# Patient Record
Sex: FEMALE | Race: OTHER | NOT HISPANIC OR LATINO
[De-identification: names, ages, dates, MRNs, and addresses within clinical notes are randomized per-mention and may not be internally consistent; named-entity substitution may affect disease eponyms.]

---

## 2018-09-19 ENCOUNTER — APPOINTMENT (OUTPATIENT)
Dept: DERMATOLOGY | Facility: CLINIC | Age: 63
End: 2018-09-19

## 2020-11-04 ENCOUNTER — RESULT REVIEW (OUTPATIENT)
Age: 65
End: 2020-11-04

## 2024-05-09 ENCOUNTER — INPATIENT (INPATIENT)
Facility: HOSPITAL | Age: 69
LOS: 2 days | Discharge: HOME | End: 2024-05-12
Attending: INTERNAL MEDICINE | Admitting: INTERNAL MEDICINE
Payer: COMMERCIAL

## 2024-05-09 VITALS
TEMPERATURE: 98 F | OXYGEN SATURATION: 97 % | HEART RATE: 58 BPM | WEIGHT: 130.07 LBS | RESPIRATION RATE: 18 BRPM | DIASTOLIC BLOOD PRESSURE: 75 MMHG | SYSTOLIC BLOOD PRESSURE: 152 MMHG | HEIGHT: 66 IN

## 2024-05-09 DIAGNOSIS — R74.01 ELEVATION OF LEVELS OF LIVER TRANSAMINASE LEVELS: ICD-10-CM

## 2024-05-09 DIAGNOSIS — R00.1 BRADYCARDIA, UNSPECIFIED: ICD-10-CM

## 2024-05-09 DIAGNOSIS — R57.9 SHOCK, UNSPECIFIED: ICD-10-CM

## 2024-05-09 DIAGNOSIS — I10 ESSENTIAL (PRIMARY) HYPERTENSION: ICD-10-CM

## 2024-05-09 DIAGNOSIS — R55 SYNCOPE AND COLLAPSE: ICD-10-CM

## 2024-05-09 DIAGNOSIS — D50.9 IRON DEFICIENCY ANEMIA, UNSPECIFIED: ICD-10-CM

## 2024-05-09 DIAGNOSIS — T44.7X5A ADVERSE EFFECT OF BETA-ADRENORECEPTOR ANTAGONISTS, INITIAL ENCOUNTER: ICD-10-CM

## 2024-05-09 DIAGNOSIS — E83.39 OTHER DISORDERS OF PHOSPHORUS METABOLISM: ICD-10-CM

## 2024-05-09 DIAGNOSIS — E87.20 ACIDOSIS, UNSPECIFIED: ICD-10-CM

## 2024-05-09 DIAGNOSIS — K21.9 GASTRO-ESOPHAGEAL REFLUX DISEASE WITHOUT ESOPHAGITIS: ICD-10-CM

## 2024-05-09 DIAGNOSIS — E78.5 HYPERLIPIDEMIA, UNSPECIFIED: ICD-10-CM

## 2024-05-09 DIAGNOSIS — N17.9 ACUTE KIDNEY FAILURE, UNSPECIFIED: ICD-10-CM

## 2024-05-09 DIAGNOSIS — E11.9 TYPE 2 DIABETES MELLITUS WITHOUT COMPLICATIONS: ICD-10-CM

## 2024-05-09 LAB
ALBUMIN SERPL ELPH-MCNC: 3.5 G/DL — SIGNIFICANT CHANGE UP (ref 3.3–5)
ALP SERPL-CCNC: 86 U/L — SIGNIFICANT CHANGE UP (ref 40–120)
ALT FLD-CCNC: 105 U/L — HIGH (ref 12–78)
ANION GAP SERPL CALC-SCNC: 7 MMOL/L — SIGNIFICANT CHANGE UP (ref 5–17)
ANISOCYTOSIS BLD QL: SLIGHT — SIGNIFICANT CHANGE UP
APTT BLD: 30.7 SEC — SIGNIFICANT CHANGE UP (ref 24.5–35.6)
AST SERPL-CCNC: 73 U/L — HIGH (ref 15–37)
BASOPHILS # BLD AUTO: 0.04 K/UL — SIGNIFICANT CHANGE UP (ref 0–0.2)
BASOPHILS NFR BLD AUTO: 0.4 % — SIGNIFICANT CHANGE UP (ref 0–2)
BILIRUB SERPL-MCNC: 0.4 MG/DL — SIGNIFICANT CHANGE UP (ref 0.2–1.2)
BUN SERPL-MCNC: 13 MG/DL — SIGNIFICANT CHANGE UP (ref 7–23)
CALCIUM SERPL-MCNC: 9.3 MG/DL — SIGNIFICANT CHANGE UP (ref 8.5–10.1)
CHLORIDE SERPL-SCNC: 104 MMOL/L — SIGNIFICANT CHANGE UP (ref 96–108)
CO2 SERPL-SCNC: 26 MMOL/L — SIGNIFICANT CHANGE UP (ref 22–31)
CREAT SERPL-MCNC: 0.77 MG/DL — SIGNIFICANT CHANGE UP (ref 0.5–1.3)
EGFR: 84 ML/MIN/1.73M2 — SIGNIFICANT CHANGE UP
EOSINOPHIL # BLD AUTO: 0.05 K/UL — SIGNIFICANT CHANGE UP (ref 0–0.5)
EOSINOPHIL NFR BLD AUTO: 0.5 % — SIGNIFICANT CHANGE UP (ref 0–6)
GLUCOSE SERPL-MCNC: 148 MG/DL — HIGH (ref 70–99)
HCT VFR BLD CALC: 36.5 % — SIGNIFICANT CHANGE UP (ref 34.5–45)
HGB BLD-MCNC: 11.6 G/DL — SIGNIFICANT CHANGE UP (ref 11.5–15.5)
HYPOCHROMIA BLD QL: SLIGHT — SIGNIFICANT CHANGE UP
IMM GRANULOCYTES NFR BLD AUTO: 0.2 % — SIGNIFICANT CHANGE UP (ref 0–0.9)
INR BLD: 0.87 RATIO — SIGNIFICANT CHANGE UP (ref 0.85–1.18)
LYMPHOCYTES # BLD AUTO: 2.13 K/UL — SIGNIFICANT CHANGE UP (ref 1–3.3)
LYMPHOCYTES # BLD AUTO: 21.8 % — SIGNIFICANT CHANGE UP (ref 13–44)
MANUAL SMEAR VERIFICATION: SIGNIFICANT CHANGE UP
MCHC RBC-ENTMCNC: 22.9 PG — LOW (ref 27–34)
MCHC RBC-ENTMCNC: 31.8 G/DL — LOW (ref 32–36)
MCV RBC AUTO: 72.1 FL — LOW (ref 80–100)
MICROCYTES BLD QL: SIGNIFICANT CHANGE UP
MONOCYTES # BLD AUTO: 0.42 K/UL — SIGNIFICANT CHANGE UP (ref 0–0.9)
MONOCYTES NFR BLD AUTO: 4.3 % — SIGNIFICANT CHANGE UP (ref 2–14)
NEUTROPHILS # BLD AUTO: 7.11 K/UL — SIGNIFICANT CHANGE UP (ref 1.8–7.4)
NEUTROPHILS NFR BLD AUTO: 72.8 % — SIGNIFICANT CHANGE UP (ref 43–77)
NRBC # BLD: 0 /100 WBCS — SIGNIFICANT CHANGE UP (ref 0–0)
PLAT MORPH BLD: NORMAL — SIGNIFICANT CHANGE UP
PLATELET # BLD AUTO: 250 K/UL — SIGNIFICANT CHANGE UP (ref 150–400)
POTASSIUM SERPL-MCNC: 4.7 MMOL/L — SIGNIFICANT CHANGE UP (ref 3.5–5.3)
POTASSIUM SERPL-SCNC: 4.7 MMOL/L — SIGNIFICANT CHANGE UP (ref 3.5–5.3)
PROT SERPL-MCNC: 7.9 GM/DL — SIGNIFICANT CHANGE UP (ref 6–8.3)
PROTHROM AB SERPL-ACNC: 10.5 SEC — SIGNIFICANT CHANGE UP (ref 9.5–13)
RBC # BLD: 5.06 M/UL — SIGNIFICANT CHANGE UP (ref 3.8–5.2)
RBC # FLD: 14.4 % — SIGNIFICANT CHANGE UP (ref 10.3–14.5)
RBC BLD AUTO: ABNORMAL
SODIUM SERPL-SCNC: 137 MMOL/L — SIGNIFICANT CHANGE UP (ref 135–145)
TROPONIN I, HIGH SENSITIVITY RESULT: 17.7 NG/L — SIGNIFICANT CHANGE UP
WBC # BLD: 9.77 K/UL — SIGNIFICANT CHANGE UP (ref 3.8–10.5)
WBC # FLD AUTO: 9.77 K/UL — SIGNIFICANT CHANGE UP (ref 3.8–10.5)

## 2024-05-09 RX ORDER — ONDANSETRON 8 MG/1
4 TABLET, FILM COATED ORAL ONCE
Refills: 0 | Status: COMPLETED | OUTPATIENT
Start: 2024-05-09 | End: 2024-05-09

## 2024-05-09 RX ORDER — SODIUM CHLORIDE 9 MG/ML
1000 INJECTION, SOLUTION INTRAVENOUS
Refills: 0 | Status: DISCONTINUED | OUTPATIENT
Start: 2024-05-09 | End: 2024-05-10

## 2024-05-09 RX ORDER — GLUCAGON INJECTION, SOLUTION 0.5 MG/.1ML
3 INJECTION, SOLUTION SUBCUTANEOUS ONCE
Refills: 0 | Status: DISCONTINUED | OUTPATIENT
Start: 2024-05-09 | End: 2024-05-09

## 2024-05-09 RX ORDER — ATROPINE SULFATE 0.1 MG/ML
1 SYRINGE (ML) INJECTION ONCE
Refills: 0 | Status: COMPLETED | OUTPATIENT
Start: 2024-05-09 | End: 2024-05-09

## 2024-05-09 RX ORDER — DEXTROSE 50 % IN WATER 50 %
25 SYRINGE (ML) INTRAVENOUS ONCE
Refills: 0 | Status: DISCONTINUED | OUTPATIENT
Start: 2024-05-09 | End: 2024-05-10

## 2024-05-09 RX ORDER — GLUCAGON INJECTION, SOLUTION 0.5 MG/.1ML
3 INJECTION, SOLUTION SUBCUTANEOUS ONCE
Refills: 0 | Status: COMPLETED | OUTPATIENT
Start: 2024-05-09 | End: 2024-05-09

## 2024-05-09 RX ORDER — SODIUM CHLORIDE 9 MG/ML
1000 INJECTION INTRAMUSCULAR; INTRAVENOUS; SUBCUTANEOUS ONCE
Refills: 0 | Status: COMPLETED | OUTPATIENT
Start: 2024-05-09 | End: 2024-05-09

## 2024-05-09 RX ORDER — EPINEPHRINE 0.3 MG/.3ML
0.03 INJECTION INTRAMUSCULAR; SUBCUTANEOUS
Qty: 4 | Refills: 0 | Status: DISCONTINUED | OUTPATIENT
Start: 2024-05-09 | End: 2024-05-10

## 2024-05-09 RX ORDER — EPINEPHRINE 0.3 MG/.3ML
0.03 INJECTION INTRAMUSCULAR; SUBCUTANEOUS
Qty: 4 | Refills: 0 | Status: DISCONTINUED | OUTPATIENT
Start: 2024-05-09 | End: 2024-05-09

## 2024-05-09 RX ORDER — INSULIN LISPRO 100/ML
VIAL (ML) SUBCUTANEOUS EVERY 6 HOURS
Refills: 0 | Status: DISCONTINUED | OUTPATIENT
Start: 2024-05-09 | End: 2024-05-10

## 2024-05-09 RX ORDER — DEXTROSE 50 % IN WATER 50 %
15 SYRINGE (ML) INTRAVENOUS ONCE
Refills: 0 | Status: DISCONTINUED | OUTPATIENT
Start: 2024-05-09 | End: 2024-05-10

## 2024-05-09 RX ORDER — DEXTROSE 50 % IN WATER 50 %
12.5 SYRINGE (ML) INTRAVENOUS ONCE
Refills: 0 | Status: DISCONTINUED | OUTPATIENT
Start: 2024-05-09 | End: 2024-05-10

## 2024-05-09 RX ORDER — GLUCAGON INJECTION, SOLUTION 0.5 MG/.1ML
1 INJECTION, SOLUTION SUBCUTANEOUS ONCE
Refills: 0 | Status: DISCONTINUED | OUTPATIENT
Start: 2024-05-09 | End: 2024-05-10

## 2024-05-09 RX ORDER — ACETAMINOPHEN 500 MG
975 TABLET ORAL ONCE
Refills: 0 | Status: COMPLETED | OUTPATIENT
Start: 2024-05-09 | End: 2024-05-09

## 2024-05-09 RX ORDER — DEXTROSE 10 % IN WATER 10 %
125 INTRAVENOUS SOLUTION INTRAVENOUS ONCE
Refills: 0 | Status: DISCONTINUED | OUTPATIENT
Start: 2024-05-09 | End: 2024-05-10

## 2024-05-09 RX ORDER — ENOXAPARIN SODIUM 100 MG/ML
40 INJECTION SUBCUTANEOUS EVERY 24 HOURS
Refills: 0 | Status: DISCONTINUED | OUTPATIENT
Start: 2024-05-10 | End: 2024-05-12

## 2024-05-09 RX ADMIN — ONDANSETRON 4 MILLIGRAM(S): 8 TABLET, FILM COATED ORAL at 19:32

## 2024-05-09 RX ADMIN — Medication 1 MILLIGRAM(S): at 18:43

## 2024-05-09 RX ADMIN — Medication 975 MILLIGRAM(S): at 17:45

## 2024-05-09 RX ADMIN — SODIUM CHLORIDE 1000 MILLILITER(S): 9 INJECTION INTRAMUSCULAR; INTRAVENOUS; SUBCUTANEOUS at 17:46

## 2024-05-09 RX ADMIN — Medication 975 MILLIGRAM(S): at 18:19

## 2024-05-09 RX ADMIN — GLUCAGON INJECTION, SOLUTION 3 MILLIGRAM(S): 0.5 INJECTION, SOLUTION SUBCUTANEOUS at 19:30

## 2024-05-09 RX ADMIN — SODIUM CHLORIDE 1000 MILLILITER(S): 9 INJECTION INTRAMUSCULAR; INTRAVENOUS; SUBCUTANEOUS at 19:37

## 2024-05-09 RX ADMIN — Medication 1 MILLIGRAM(S): at 18:20

## 2024-05-09 RX ADMIN — EPINEPHRINE 5.53 MICROGRAM(S)/KG/MIN: 0.3 INJECTION INTRAMUSCULAR; SUBCUTANEOUS at 20:18

## 2024-05-09 RX ADMIN — ONDANSETRON 4 MILLIGRAM(S): 8 TABLET, FILM COATED ORAL at 18:28

## 2024-05-09 NOTE — ED PROVIDER NOTE - CRITICAL CARE ATTENDING CONTRIBUTION TO CARE
Pt with acute decompensation, HR 30s, otherwise bedside atropine given and consultation to Cardiology with additional information sought with [facility]  - call made to ascertain what med was given for procedure. As it is unclear what was given for HR reduction at [facility] - discussion with Dr Vincent - cardiology -suggested close monitoring for tachycardia, but no glucagon to be given at this time however given we are unsure of what med was given during CT coronary,

## 2024-05-09 NOTE — ED ADULT NURSE NOTE - CHPI ED NUR SYMPTOMS POS
biba s/p syncopal episode in parking lot of [facility] pt was d/c'd from site after cta hasn't eaten or taken her meds today due to procedure , per ems was given "meds to lower heart rate" during procedure denies anticoagulation use, home meds: metformin asa amlodipine, simvastatin, mild dizziness at present, c collar in place per ems/SYNCOPE

## 2024-05-09 NOTE — H&P ADULT - CRITICAL CARE ATTENDING COMMENT
68 yo woman in pw syncope, shock and bradycardia from BB overdose.  On epi drip.  Supposedl from one single dose of 150 metoprolol given during coronary angiogram.   Tox consult placed. No further recs  Would agressively wean Epi as it has been now 8 hours since dose.  Should be improving.  If not would reasses etiology of miladis cardia.  Has not bee accepted for transfer at this time.   Monitor Lytes, and gluse while on EPI.  Pad on patient.

## 2024-05-09 NOTE — ED PROVIDER NOTE - RESPIRATORY, MLM
Bill As?: Note: Bill Malignant Destruction If Path Confirms Malignant Lesion. Only Bill As Shave Removal If Path Comes Back Benign. Do Not Bill Shave Removal On Malignant Lesions.: Malignant Destruction Render Post-Care Instructions In Note?: no Dressing: dry sterile dressing Hemostasis: Electrocautery and Aluminum Chloride Post-Care Instructions: I reviewed with the patient in detail post-care instructions. Patient is to keep the biopsy site dry overnight, and then apply mupirocin ointment twice daily until healed. Patient should keep area covered with a band-aid for 1 week (ok to leave uncovered if at home). Patient may apply hydrogen peroxide soaks to remove any crusting. Size Of Lesion In Cm: 0.9 Lab: 441 Wound Care: Aquaphor Size After Destruction (Required For Destruction Billing): 1.1 Consent: Verbal consent was obtained and risks were reviewed including but not limited to scarring, infection, bleeding, scabbing, incomplete removal, nerve damage and allergy to anesthesia. Path Notes (To The Dermatopathologist): 6mm Anesthesia Type: 1% lidocaine without epinephrine Lab Facility: [id number] Number Of Curettages: 3 Billing Type: Third-Party Bill Was Curettage Performed?: Yes Cautery Type: electrodesiccation Anesthesia Volume In Cc: 1 Anesthesia Volume In Cc: 0 Detail Level: Detailed Notification Instructions: Patient will be notified of biopsy results. However, patient instructed to call the office if not contacted within 2 weeks. Breath sounds clear and equal bilaterally.

## 2024-05-09 NOTE — ED PROVIDER NOTE - PROGRESS NOTE DETAILS
call made to [facility] Radiology - no tech currently available to speak regarding medication use. Pt states she did have amlodipine 10mg last night. Pt with decompensation noted HR dropping to 30s, now lethargic and speaking but appears very tired. Given atropine 1mg. Pt and daughter otherwise unsure of what med was given for CTA coronary to slow HR. Discussed case with Dr Vincent - suggested pt to have atropine 2nd dose of 1mg. Otherwise pt remains in junctional HR of 33, BP is 103/56. Initially glucagon to be given since it may be presumed pt is having beta or calcium channel toxicology consultation aware of case - agrees with management in ED thus far - states may continue epinephrine drip for now as needed and no need for insulin drip for now. May keep in ICU here at [facility].

## 2024-05-09 NOTE — ED ADULT NURSE NOTE - NSFALLUNIVINTERV_ED_ALL_ED
Bed/Stretcher in lowest position, wheels locked, appropriate side rails in place/Call bell, personal items and telephone in reach/Instruct patient to call for assistance before getting out of bed/chair/stretcher/Non-slip footwear applied when patient is off stretcher/[place] to call system/Physically safe environment - no spills, clutter or unnecessary equipment/Purposeful proactive rounding/Room/bathroom lighting operational, light cord in reach

## 2024-05-09 NOTE — ED ADULT NURSE REASSESSMENT NOTE - NS ED NURSE REASSESS COMMENT FT1
pt was AAO X 3 ambulatory with steady gait went to rest room with PCA assistance and passed out while using the bathroom MD notified, MD RN PCA at bedside retuned pt to stretcher placed on monitor  EKG taken code cart and zoll pads attached to pt shown to have bradycardia and placed in res B. pt alert awake oriented x 3  responds to verbal stimuli

## 2024-05-09 NOTE — ED PROVIDER NOTE - CARE PLAN
1 Principal Discharge DX:	Junctional bradycardia   Principal Discharge DX:	Junctional bradycardia  Secondary Diagnosis:	Adverse reaction to metoprolol

## 2024-05-09 NOTE — ED ADULT NURSE NOTE - CHIEF COMPLAINT QUOTE
biba s/p syncopal episode in parking lot of [facility] pt was d/c'd from site after cta hasn't eaten or taken her meds today due to procedure , per ems was given "meds to lower heart rate" during procedure denies anticoagulation use, home meds: metformin asa amlodipine, simvastatin, mild dizziness at present, c collar in place per ems

## 2024-05-09 NOTE — ED PROVIDER NOTE - OBJECTIVE STATEMENT
69 year old female with PMH of DM II, HTN, HLD otherwise presenting to ED after having CT coronary angiogram at [facility] today - states did not have any lunch and otherwise felt faint then passed out in parking lot of radiology center. Pt currently awake and alert and brought in having hit back of head with no neck pain but was initially placed on C-Collar by EMS. Pt states she feels like she is back to baseline at triage.

## 2024-05-09 NOTE — ED PROVIDER NOTE - CLINICAL SUMMARY MEDICAL DECISION MAKING FREE TEXT BOX
Pt with bradycardia - junctional rhythm with decompensation in ED - otherwise case was reviewed with Dr Vincent - atropine 1mg x2 given in ED - a third dose to be given if HR remains in 30s, close monitoring in ED given Pt with bradycardia - junctional rhythm with decompensation in ED - otherwise case was reviewed with Dr Vincent - atropine 1mg x2 given in ED - a third dose to be given if HR remains in 30s, close monitoring in ED given    Otherwise pt initiated on epinephrine drip in ED due to bradycardia and hypotension - improvement of vitals noted and pt now feeling improvement of overall lethargy and BP and HR improved with HR currently at 50, BP 110s systolic. Pt with bradycardia - junctional rhythm with decompensation in ED - otherwise case was reviewed with Dr Vincent - atropine 1mg x2 given in ED - a third dose to be given if HR remains in 30s, close monitoring in ED given    Otherwise pt initiated on epinephrine drip in ED due to bradycardia and hypotension - improvement of vitals noted and pt now feeling improvement of overall lethargy and BP and HR improved with HR currently at 50, BP 110s systolic.    Toxicology consultation to be called.

## 2024-05-09 NOTE — H&P ADULT - ASSESSMENT
Pt is a 69 year old female with a pmhx of DM, HTN, HLD who presents from CT coronary angiogram for syncope found to have:    1. Shock  2. Bradycardia   3. BB toxicity     Plan  Neuro: CTB and neck negative s/p fall  Cardio: Hypotension and bradycardia suspected related to 150mg metoprolol taking around 6pm today. Now with junctional rhythm requiring epi infusion. Actively titrate to MAP of 65 and HR above 40. POCUS with preserved systolic fnx, IVC plump. s/p 2L IVF. Obtain TTE. ER spoke with toxicology no need for transfer as pressor requirements down-trending. also no need for  insulin infusion at this time. Close cardiac monitoring   Pulm: on room air. check chest xray  GI: NPO for now  Renal: strict i/o, renally dose meds prn   ID: no issues  Heme: sq lovenox + SCDs on morning   Endo: ISS q6 for now    Dispo: Admit to ICU, pt is full code, case d/w Dr. Wilkins. updated family at bedside

## 2024-05-09 NOTE — PATIENT PROFILE ADULT - FUNCTIONAL ASSESSMENT - DAILY ACTIVITY 1.
Requested Prescriptions     Signed Prescriptions Disp Refills    lisinopril (PRINIVIL, ZESTRIL) 40 mg tablet 90 Tab 1     Sig: Take 1 Tab by mouth daily. Authorizing Provider: Kirk Gibbs VITAMIN D2 50,000 unit capsule 12 Cap 1     Sig: Take 1 Cap by mouth every seven (7) days. Authorizing Provider: Artis Allen     Please call [facility] and cancel prescriptions. Please call patient and advise that prescriptions have been sent [facility]. 4 = No assist / stand by assistance

## 2024-05-09 NOTE — ED ADULT NURSE NOTE - OBJECTIVE STATEMENT
biba s/p syncopal episode in parking lot of [facility] pt was d/c'd from site after cta hasn't eaten or taken her meds today due to procedure , per ems was given "meds to lower heart rate" during procedure denies anticoagulation use, home meds: metformin asa amlodipine, simvastatin, mild dizziness at present, c collar in place per ems. pt c collar cleared by provider pt ambulatory with steady gait

## 2024-05-09 NOTE — H&P ADULT - NSHPPHYSICALEXAM_GEN_ALL_CORE
General: elderly female in bed, NAD somewhat confused  HEENt: no jvd  Pulm: clear  Cardio: +s1.s2  abd: soft  Extr: no edema   Neuro: awake, follows commands

## 2024-05-09 NOTE — PATIENT PROFILE ADULT - FALL HARM RISK - HARM RISK INTERVENTIONS

## 2024-05-09 NOTE — H&P ADULT - NSHPLABSRESULTS_GEN_ALL_CORE
05-09    137  |  104  |  13  ----------------------------<  148<H>  4.7   |  26  |  0.77    Ca    9.3      09 May 2024 16:58    TPro  7.9  /  Alb  3.5  /  TBili  0.4  /  DBili  x   /  AST  73<H>  /  ALT  105<H>  /  AlkPhos  86  05-09

## 2024-05-09 NOTE — ED ADULT TRIAGE NOTE - CHIEF COMPLAINT QUOTE
biba s/p syncopal episode in parking lot of [facility] pt was d/c'd from site after cta hasn't eaten or taken her meds today due to procedure , per ems was given "meds to lower heart rate" during procedure denies anticoagulation use, home meds: metformin asa amlodipine, simvastatin, mild dizziness at present, c collar in place per ems

## 2024-05-09 NOTE — H&P ADULT - HISTORY OF PRESENT ILLNESS
Pt is a 69 year old female with a pmhx of DM, HTN, HLD who presents from CT coronary angiogram for syncope. Per family at bedside pt PCP recommended angio for evaluation of CAD. Upon arrival pt noted to have hit head, no LOC. CTB and neck negative. Further found to have additional syncope episode with associated junctional bradycardia. history obtained and noted that patient received 150mg metoprolol during imaging. Pt given atropine, glucagon and ultimately ended up on epi infusion for hypotension. MICU consulted for above

## 2024-05-10 LAB
A1C WITH ESTIMATED AVERAGE GLUCOSE RESULT: 6.8 % — HIGH (ref 4–5.6)
ALBUMIN SERPL ELPH-MCNC: 2.8 G/DL — LOW (ref 3.3–5)
ALBUMIN SERPL ELPH-MCNC: 3 G/DL — LOW (ref 3.3–5)
ALP SERPL-CCNC: 70 U/L — SIGNIFICANT CHANGE UP (ref 40–120)
ALP SERPL-CCNC: 81 U/L — SIGNIFICANT CHANGE UP (ref 40–120)
ALT FLD-CCNC: 437 U/L — HIGH (ref 12–78)
ALT FLD-CCNC: 486 U/L — HIGH (ref 12–78)
ANION GAP SERPL CALC-SCNC: 17 MMOL/L — SIGNIFICANT CHANGE UP (ref 5–17)
ANION GAP SERPL CALC-SCNC: 5 MMOL/L — SIGNIFICANT CHANGE UP (ref 5–17)
AST SERPL-CCNC: 355 U/L — HIGH (ref 15–37)
AST SERPL-CCNC: 498 U/L — HIGH (ref 15–37)
BILIRUB SERPL-MCNC: 0.4 MG/DL — SIGNIFICANT CHANGE UP (ref 0.2–1.2)
BILIRUB SERPL-MCNC: 0.6 MG/DL — SIGNIFICANT CHANGE UP (ref 0.2–1.2)
BUN SERPL-MCNC: 15 MG/DL — SIGNIFICANT CHANGE UP (ref 7–23)
BUN SERPL-MCNC: 19 MG/DL — SIGNIFICANT CHANGE UP (ref 7–23)
CALCIUM SERPL-MCNC: 8 MG/DL — LOW (ref 8.5–10.1)
CALCIUM SERPL-MCNC: 8.1 MG/DL — LOW (ref 8.5–10.1)
CHLORIDE SERPL-SCNC: 109 MMOL/L — HIGH (ref 96–108)
CHLORIDE SERPL-SCNC: 111 MMOL/L — HIGH (ref 96–108)
CO2 SERPL-SCNC: 12 MMOL/L — LOW (ref 22–31)
CO2 SERPL-SCNC: 25 MMOL/L — SIGNIFICANT CHANGE UP (ref 22–31)
CREAT SERPL-MCNC: 0.82 MG/DL — SIGNIFICANT CHANGE UP (ref 0.5–1.3)
CREAT SERPL-MCNC: 1.34 MG/DL — HIGH (ref 0.5–1.3)
EGFR: 43 ML/MIN/1.73M2 — LOW
EGFR: 78 ML/MIN/1.73M2 — SIGNIFICANT CHANGE UP
ESTIMATED AVERAGE GLUCOSE: 148 MG/DL — HIGH (ref 68–114)
GAS PNL BLDA: SIGNIFICANT CHANGE UP
GLUCOSE BLDC GLUCOMTR-MCNC: 168 MG/DL — HIGH (ref 70–99)
GLUCOSE BLDC GLUCOMTR-MCNC: 290 MG/DL — HIGH (ref 70–99)
GLUCOSE BLDC GLUCOMTR-MCNC: 384 MG/DL — HIGH (ref 70–99)
GLUCOSE BLDC GLUCOMTR-MCNC: 402 MG/DL — HIGH (ref 70–99)
GLUCOSE BLDC GLUCOMTR-MCNC: 77 MG/DL — SIGNIFICANT CHANGE UP (ref 70–99)
GLUCOSE BLDC GLUCOMTR-MCNC: 86 MG/DL — SIGNIFICANT CHANGE UP (ref 70–99)
GLUCOSE SERPL-MCNC: 328 MG/DL — HIGH (ref 70–99)
GLUCOSE SERPL-MCNC: 62 MG/DL — LOW (ref 70–99)
HCT VFR BLD CALC: 34.5 % — SIGNIFICANT CHANGE UP (ref 34.5–45)
HGB BLD-MCNC: 10.5 G/DL — LOW (ref 11.5–15.5)
MAGNESIUM SERPL-MCNC: 1.8 MG/DL — SIGNIFICANT CHANGE UP (ref 1.6–2.6)
MAGNESIUM SERPL-MCNC: 2 MG/DL — SIGNIFICANT CHANGE UP (ref 1.6–2.6)
MCHC RBC-ENTMCNC: 22.8 PG — LOW (ref 27–34)
MCHC RBC-ENTMCNC: 30.4 G/DL — LOW (ref 32–36)
MCV RBC AUTO: 74.8 FL — LOW (ref 80–100)
NRBC # BLD: 0 /100 WBCS — SIGNIFICANT CHANGE UP (ref 0–0)
PHOSPHATE SERPL-MCNC: 2.5 MG/DL — SIGNIFICANT CHANGE UP (ref 2.5–4.5)
PHOSPHATE SERPL-MCNC: 4.9 MG/DL — HIGH (ref 2.5–4.5)
PLATELET # BLD AUTO: 188 K/UL — SIGNIFICANT CHANGE UP (ref 150–400)
POTASSIUM SERPL-MCNC: 3.9 MMOL/L — SIGNIFICANT CHANGE UP (ref 3.5–5.3)
POTASSIUM SERPL-MCNC: 4.3 MMOL/L — SIGNIFICANT CHANGE UP (ref 3.5–5.3)
POTASSIUM SERPL-SCNC: 3.9 MMOL/L — SIGNIFICANT CHANGE UP (ref 3.5–5.3)
POTASSIUM SERPL-SCNC: 4.3 MMOL/L — SIGNIFICANT CHANGE UP (ref 3.5–5.3)
PROT SERPL-MCNC: 6.3 GM/DL — SIGNIFICANT CHANGE UP (ref 6–8.3)
PROT SERPL-MCNC: 6.9 GM/DL — SIGNIFICANT CHANGE UP (ref 6–8.3)
RBC # BLD: 4.61 M/UL — SIGNIFICANT CHANGE UP (ref 3.8–5.2)
RBC # FLD: 14.6 % — HIGH (ref 10.3–14.5)
SODIUM SERPL-SCNC: 138 MMOL/L — SIGNIFICANT CHANGE UP (ref 135–145)
SODIUM SERPL-SCNC: 141 MMOL/L — SIGNIFICANT CHANGE UP (ref 135–145)
TROPONIN I, HIGH SENSITIVITY RESULT: 20.5 NG/L — SIGNIFICANT CHANGE UP
WBC # BLD: 13.45 K/UL — HIGH (ref 3.8–10.5)
WBC # FLD AUTO: 13.45 K/UL — HIGH (ref 3.8–10.5)

## 2024-05-10 RX ORDER — PANTOPRAZOLE SODIUM 20 MG/1
40 TABLET, DELAYED RELEASE ORAL
Refills: 0 | Status: DISCONTINUED | OUTPATIENT
Start: 2024-05-10 | End: 2024-05-12

## 2024-05-10 RX ORDER — INSULIN LISPRO 100/ML
VIAL (ML) SUBCUTANEOUS
Refills: 0 | Status: DISCONTINUED | OUTPATIENT
Start: 2024-05-10 | End: 2024-05-12

## 2024-05-10 RX ORDER — INSULIN GLARGINE 100 [IU]/ML
5 INJECTION, SOLUTION SUBCUTANEOUS AT BEDTIME
Refills: 0 | Status: DISCONTINUED | OUTPATIENT
Start: 2024-05-10 | End: 2024-05-10

## 2024-05-10 RX ORDER — ATORVASTATIN CALCIUM 80 MG/1
20 TABLET, FILM COATED ORAL AT BEDTIME
Refills: 0 | Status: DISCONTINUED | OUTPATIENT
Start: 2024-05-10 | End: 2024-05-12

## 2024-05-10 RX ORDER — ASPIRIN/CALCIUM CARB/MAGNESIUM 324 MG
81 TABLET ORAL DAILY
Refills: 0 | Status: DISCONTINUED | OUTPATIENT
Start: 2024-05-10 | End: 2024-05-12

## 2024-05-10 RX ORDER — NOREPINEPHRINE BITARTRATE/D5W 8 MG/250ML
0.05 PLASTIC BAG, INJECTION (ML) INTRAVENOUS
Qty: 8 | Refills: 0 | Status: DISCONTINUED | OUTPATIENT
Start: 2024-05-10 | End: 2024-05-10

## 2024-05-10 RX ORDER — SODIUM CHLORIDE 9 MG/ML
1000 INJECTION, SOLUTION INTRAVENOUS
Refills: 0 | Status: DISCONTINUED | OUTPATIENT
Start: 2024-05-10 | End: 2024-05-10

## 2024-05-10 RX ORDER — CHLORHEXIDINE GLUCONATE 213 G/1000ML
1 SOLUTION TOPICAL DAILY
Refills: 0 | Status: DISCONTINUED | OUTPATIENT
Start: 2024-05-10 | End: 2024-05-12

## 2024-05-10 RX ORDER — INSULIN GLARGINE 100 [IU]/ML
5 INJECTION, SOLUTION SUBCUTANEOUS EVERY MORNING
Refills: 0 | Status: DISCONTINUED | OUTPATIENT
Start: 2024-05-11 | End: 2024-05-12

## 2024-05-10 RX ORDER — SODIUM BICARBONATE 1 MEQ/ML
100 SYRINGE (ML) INTRAVENOUS ONCE
Refills: 0 | Status: COMPLETED | OUTPATIENT
Start: 2024-05-10 | End: 2024-05-10

## 2024-05-10 RX ORDER — INSULIN GLARGINE 100 [IU]/ML
5 INJECTION, SOLUTION SUBCUTANEOUS ONCE
Refills: 0 | Status: COMPLETED | OUTPATIENT
Start: 2024-05-10 | End: 2024-05-10

## 2024-05-10 RX ORDER — COLCHICINE 0.6 MG
0.6 TABLET ORAL DAILY
Refills: 0 | Status: DISCONTINUED | OUTPATIENT
Start: 2024-05-10 | End: 2024-05-12

## 2024-05-10 RX ADMIN — Medication 0.6 MILLIGRAM(S): at 18:06

## 2024-05-10 RX ADMIN — Medication 2: at 21:32

## 2024-05-10 RX ADMIN — Medication 81 MILLIGRAM(S): at 18:06

## 2024-05-10 RX ADMIN — ENOXAPARIN SODIUM 40 MILLIGRAM(S): 100 INJECTION SUBCUTANEOUS at 05:42

## 2024-05-10 RX ADMIN — SODIUM CHLORIDE 100 MILLILITER(S): 9 INJECTION, SOLUTION INTRAVENOUS at 01:17

## 2024-05-10 RX ADMIN — INSULIN GLARGINE 5 UNIT(S): 100 INJECTION, SOLUTION SUBCUTANEOUS at 10:36

## 2024-05-10 RX ADMIN — EPINEPHRINE 5.53 MICROGRAM(S)/KG/MIN: 0.3 INJECTION INTRAMUSCULAR; SUBCUTANEOUS at 00:05

## 2024-05-10 RX ADMIN — Medication 5.98 MICROGRAM(S)/KG/MIN: at 07:30

## 2024-05-10 RX ADMIN — Medication 10: at 00:30

## 2024-05-10 RX ADMIN — EPINEPHRINE 5.53 MICROGRAM(S)/KG/MIN: 0.3 INJECTION INTRAMUSCULAR; SUBCUTANEOUS at 07:30

## 2024-05-10 RX ADMIN — CHLORHEXIDINE GLUCONATE 1 APPLICATION(S): 213 SOLUTION TOPICAL at 11:42

## 2024-05-10 RX ADMIN — Medication 6: at 05:42

## 2024-05-10 RX ADMIN — Medication 100 MILLIEQUIVALENT(S): at 06:50

## 2024-05-10 RX ADMIN — Medication 5.98 MICROGRAM(S)/KG/MIN: at 06:49

## 2024-05-10 NOTE — CHART NOTE - NSCHARTNOTEFT_GEN_A_CORE
69 year old female with a pmhx of DM, HTN, HLD who presents from CT coronary angiogram where she received 150 mg of Metoprolol for syncope found to have:    1. Shock  2. Bradycardia   3. BB toxicity     Plan  Neuro: CTB and neck negative s/p fall. At baseline mental status. NAD. no issues.  Cardio: NSR 80s. off levo/epi. Incidence was surmise beta-blockade/ medication related. Now that is out of system, patient hemodynamically stable. Tox was consulted overnight and received Glucagon. Hold all antihypertensives. f/u official echo. Appreciate cardiology consult  Pulm: on room air. CXR clear today  GI: normal diet.  Renal: Aaliyah, improving suspect that electrolyte abnormalities were from epi gtt which has now improved since off.    ID: no issues, monitor off abx  Heme: sq lovenox + aspirin   Endo: ISS with basal insulin (on metformin at home)  Dispo: Downgrade to Tele. Discussed with ICU attending MD Eastman. Sign out to hospitalist MD via teams.

## 2024-05-10 NOTE — CONSULT NOTE ADULT - ASSESSMENT
69 F DM HTN HLD present to ER after [facility] recieved NTG 0.8 and Metoprolol 150mg PO around 15:30   thereafter ~17:00 had syncope, recurred in ER with narrow escape junctional miladis 33-46/min but with /52  Syncope recurred  with hypotension despite atropine x 2 and glucagon, started on epi drip and CCU admit  Trop negative No STT shift no chest pain  Notably elevated [id number]  Desat event during night with pH 7.17 HCO3 14 PCO2 38 PO2 37; Glucose 328, Cr 1.34   Home med amlodipine not yet resumed

## 2024-05-11 ENCOUNTER — RESULT REVIEW (OUTPATIENT)
Age: 69
End: 2024-05-11

## 2024-05-11 LAB
ALBUMIN SERPL ELPH-MCNC: 2.7 G/DL — LOW (ref 3.3–5)
ALP SERPL-CCNC: 72 U/L — SIGNIFICANT CHANGE UP (ref 40–120)
ALT FLD-CCNC: 413 U/L — HIGH (ref 12–78)
ANION GAP SERPL CALC-SCNC: 6 MMOL/L — SIGNIFICANT CHANGE UP (ref 5–17)
AST SERPL-CCNC: 262 U/L — HIGH (ref 15–37)
BASOPHILS # BLD AUTO: 0.01 K/UL — SIGNIFICANT CHANGE UP (ref 0–0.2)
BASOPHILS NFR BLD AUTO: 0.1 % — SIGNIFICANT CHANGE UP (ref 0–2)
BILIRUB SERPL-MCNC: 0.6 MG/DL — SIGNIFICANT CHANGE UP (ref 0.2–1.2)
BUN SERPL-MCNC: 16 MG/DL — SIGNIFICANT CHANGE UP (ref 7–23)
CALCIUM SERPL-MCNC: 8.3 MG/DL — LOW (ref 8.5–10.1)
CHLORIDE SERPL-SCNC: 111 MMOL/L — HIGH (ref 96–108)
CO2 SERPL-SCNC: 23 MMOL/L — SIGNIFICANT CHANGE UP (ref 22–31)
CREAT SERPL-MCNC: 0.77 MG/DL — SIGNIFICANT CHANGE UP (ref 0.5–1.3)
EGFR: 84 ML/MIN/1.73M2 — SIGNIFICANT CHANGE UP
EOSINOPHIL # BLD AUTO: 0.02 K/UL — SIGNIFICANT CHANGE UP (ref 0–0.5)
EOSINOPHIL NFR BLD AUTO: 0.3 % — SIGNIFICANT CHANGE UP (ref 0–6)
GLUCOSE BLDC GLUCOMTR-MCNC: 101 MG/DL — HIGH (ref 70–99)
GLUCOSE BLDC GLUCOMTR-MCNC: 133 MG/DL — HIGH (ref 70–99)
GLUCOSE BLDC GLUCOMTR-MCNC: 133 MG/DL — HIGH (ref 70–99)
GLUCOSE BLDC GLUCOMTR-MCNC: 204 MG/DL — HIGH (ref 70–99)
GLUCOSE SERPL-MCNC: 86 MG/DL — SIGNIFICANT CHANGE UP (ref 70–99)
HCT VFR BLD CALC: 28.3 % — LOW (ref 34.5–45)
HGB BLD-MCNC: 8.8 G/DL — LOW (ref 11.5–15.5)
IMM GRANULOCYTES NFR BLD AUTO: 0.1 % — SIGNIFICANT CHANGE UP (ref 0–0.9)
IRON SATN MFR SERPL: 35 % — SIGNIFICANT CHANGE UP (ref 14–50)
IRON SATN MFR SERPL: 91 UG/DL — SIGNIFICANT CHANGE UP (ref 30–160)
LYMPHOCYTES # BLD AUTO: 2.67 K/UL — SIGNIFICANT CHANGE UP (ref 1–3.3)
LYMPHOCYTES # BLD AUTO: 35.1 % — SIGNIFICANT CHANGE UP (ref 13–44)
MAGNESIUM SERPL-MCNC: 2.1 MG/DL — SIGNIFICANT CHANGE UP (ref 1.6–2.6)
MCHC RBC-ENTMCNC: 22.5 PG — LOW (ref 27–34)
MCHC RBC-ENTMCNC: 31.1 G/DL — LOW (ref 32–36)
MCV RBC AUTO: 72.4 FL — LOW (ref 80–100)
MONOCYTES # BLD AUTO: 0.29 K/UL — SIGNIFICANT CHANGE UP (ref 0–0.9)
MONOCYTES NFR BLD AUTO: 3.8 % — SIGNIFICANT CHANGE UP (ref 2–14)
NEUTROPHILS # BLD AUTO: 4.61 K/UL — SIGNIFICANT CHANGE UP (ref 1.8–7.4)
NEUTROPHILS NFR BLD AUTO: 60.6 % — SIGNIFICANT CHANGE UP (ref 43–77)
NRBC # BLD: 0 /100 WBCS — SIGNIFICANT CHANGE UP (ref 0–0)
PHOSPHATE SERPL-MCNC: 2.2 MG/DL — LOW (ref 2.5–4.5)
PLATELET # BLD AUTO: 157 K/UL — SIGNIFICANT CHANGE UP (ref 150–400)
POTASSIUM SERPL-MCNC: 3.8 MMOL/L — SIGNIFICANT CHANGE UP (ref 3.5–5.3)
POTASSIUM SERPL-SCNC: 3.8 MMOL/L — SIGNIFICANT CHANGE UP (ref 3.5–5.3)
PROT SERPL-MCNC: 6.3 GM/DL — SIGNIFICANT CHANGE UP (ref 6–8.3)
RBC # BLD: 3.91 M/UL — SIGNIFICANT CHANGE UP (ref 3.8–5.2)
RBC # FLD: 14.3 % — SIGNIFICANT CHANGE UP (ref 10.3–14.5)
SODIUM SERPL-SCNC: 140 MMOL/L — SIGNIFICANT CHANGE UP (ref 135–145)
TIBC SERPL-MCNC: 263 UG/DL — SIGNIFICANT CHANGE UP (ref 220–430)
UIBC SERPL-MCNC: 172 UG/DL — SIGNIFICANT CHANGE UP (ref 110–370)
WBC # BLD: 7.61 K/UL — SIGNIFICANT CHANGE UP (ref 3.8–10.5)
WBC # FLD AUTO: 7.61 K/UL — SIGNIFICANT CHANGE UP (ref 3.8–10.5)

## 2024-05-11 RX ORDER — SODIUM,POTASSIUM PHOSPHATES 278-250MG
1 POWDER IN PACKET (EA) ORAL ONCE
Refills: 0 | Status: COMPLETED | OUTPATIENT
Start: 2024-05-11 | End: 2024-05-11

## 2024-05-11 RX ADMIN — CHLORHEXIDINE GLUCONATE 1 APPLICATION(S): 213 SOLUTION TOPICAL at 12:16

## 2024-05-11 RX ADMIN — INSULIN GLARGINE 5 UNIT(S): 100 INJECTION, SOLUTION SUBCUTANEOUS at 09:15

## 2024-05-11 RX ADMIN — Medication 81 MILLIGRAM(S): at 12:07

## 2024-05-11 RX ADMIN — ENOXAPARIN SODIUM 40 MILLIGRAM(S): 100 INJECTION SUBCUTANEOUS at 05:45

## 2024-05-11 RX ADMIN — Medication 1 PACKET(S): at 06:17

## 2024-05-11 RX ADMIN — PANTOPRAZOLE SODIUM 40 MILLIGRAM(S): 20 TABLET, DELAYED RELEASE ORAL at 09:23

## 2024-05-11 RX ADMIN — ATORVASTATIN CALCIUM 20 MILLIGRAM(S): 80 TABLET, FILM COATED ORAL at 21:54

## 2024-05-11 RX ADMIN — Medication 0.6 MILLIGRAM(S): at 12:07

## 2024-05-11 NOTE — CHART NOTE - NSCHARTNOTEFT_GEN_A_CORE
Patient seen for RN consult for MST score 2 or >, seen in CCU, downgrade noted to Telemetry.  Pt without report of wt. changes PTA, c good appetite, followed CHO controlled, low sodium diet @ home.  Pt did own shopping/cooking, without food allergies/intolerances.      Pt adm c junctional bradycardia, adverse reaction to metoprolol.  PMH include HLD, Type 2 DM, HTN.  Pt was on metformin for DM, has CGM, last A1C was 7.2%, & was working on bringing it down.    Diet, Consistent Carbohydrate w/Evening Snack (05-10-24 @ 09:24) [Active]  Height (cm): 167.6 (05-09)  Weight (kg): 63.8 (05-09)  BMI (kg/m2): 22.7 (05-09)  Usual Wt. 139 LBS(63.2 kg)  05-11 Na140 mmol/L Glu 86 mg/dL K+ 3.8 mmol/L Cr  0.77 mg/dL BUN 16 mg/dL 05-11 Phos 2.2 mg/dL<L> 05-11 Alb 2.7 g/dL<L>05-11  U/L<H>  U/L<H> Alkaline Phosphatase 72 U/L  05-10-24 @ 03:50 a1c 6.8<H-good glycemic control>    Patient has been screened for and presents negative for  -Pressure ulcers stage 2 or greater  -Enteral or Parenteral Nutrition  -BMI <18  -EvtbhigrdtY0X >8  -Chewing/Swallowing Difficulty  -Decreased appetite  -Unintentional Weight Loss  -Inadequate diet (i.e. NPO/Clear Liquids)  Nutrition therapy for heart healthy, consistent CHO intake, portion plate method, blood glucose goals, low glucose, sick day with diabetes, reviewed, written information c RD's contact # provided.    No further intervention required at this time. RD remains available. Patient seen for RN consult for MST score 2 or >, seen in CCU, downgrade noted to Telemetry.  Pt without report of wt. changes PTA, c good appetite, followed CHO controlled, low sodium diet @ home.  Pt did own shopping/cooking, without food allergies/intolerances.      Pt adm c junctional bradycardia, adverse reaction to metoprolol.  PMH include HLD, Type 2 DM, HTN.  Problem diagnosis include GERD, microcytic anemia, transaminitis.  Pt was on metformin for DM, has CGM, last A1C was 7.2%, & was working on bringing it down.    Diet, Consistent Carbohydrate w/Evening Snack (05-10-24 @ 09:24) [Active]  Height (cm): 167.6 (05-09)  Weight (kg): 63.8 (05-09)  BMI (kg/m2): 22.7 (05-09)  Usual Wt. 139 LBS(63.2 kg)  05-11 Na140 mmol/L Glu 86 mg/dL K+ 3.8 mmol/L Cr  0.77 mg/dL BUN 16 mg/dL 05-11 Phos 2.2 mg/dL<L> 05-11 Alb 2.7 g/dL<L>05-11  U/L<H>  U/L<H> Alkaline Phosphatase 72 U/L  05-10-24 @ 03:50 a1c 6.8<H-good glycemic control>    Patient has been screened for and presents negative for  -Pressure ulcers stage 2 or greater  -Enteral or Parenteral Nutrition  -BMI <18  -PytqucnrosT7S >8  -Chewing/Swallowing Difficulty  -Decreased appetite  -Unintentional Weight Loss  -Inadequate diet (i.e. NPO/Clear Liquids)  Nutrition therapy for heart healthy, consistent CHO intake, portion plate method, adequate intake from all food groups reviewed, blood glucose goals, low glucose, sick day with diabetes, reviewed, written information c RD's contact # provided.    No further intervention required at this time. RD remains available. Patient seen for RN consult for MST score 2 or >, seen in CCU, downgrade noted to Telemetry.  Pt without report of wt. changes PTA, c good appetite, followed CHO controlled, low sodium diet @ home.  Pt did own shopping/cooking, without food allergies/intolerances.      Pt adm c junctional bradycardia, adverse reaction to metoprolol.  PMH include HLD, Type 2 DM, HTN.  Problem diagnosis include GERD, microcytic anemia, transaminitis.  Pt was on metformin for DM, has CGM, last A1C was 7.2%, & was working on bringing it down.    Diet, Consistent Carbohydrate w/Evening Snack (05-10-24 @ 09:24) [Active]  Height (cm): 167.6 (05-09)  Weight (kg): 63.8 (05-09)  BMI (kg/m2): 22.7 (05-09)  Usual Wt. 139 LBS(63.2 kg)  05-11 Na140 mmol/L Glu 86 mg/dL K+ 3.8 mmol/L Cr  0.77 mg/dL BUN 16 mg/dL 05-11 Phos 2.2 mg/dL<L> 05-11 Alb 2.7 g/dL<L>05-11  U/L<H>  U/L<H> Alkaline Phosphatase 72 U/L  05-10-24 @ 03:50 a1c 6.8<H-good glycemic control>    Patient has been screened for and presents negative for  -Pressure ulcers stage 2 or greater  -Enteral or Parenteral Nutrition  -BMI <18  -PowyclmqnfJ0A >8  -Chewing/Swallowing Difficulty  -Decreased appetite  -Unintentional Weight Loss  -Inadequate diet (i.e. NPO/Clear Liquids)  Nutrition therapy for heart healthy, consistent CHO intake, portion plate method, adequate intake from all food groups reviewed, blood glucose goals, low glucose, sick day with diabetes, reviewed, written information c RD's contact # provided.    Recommend change diet to DASH/ TLC, consistent carbohydrate c evening snack.    No further intervention required at this time. RD remains available.

## 2024-05-11 NOTE — PROGRESS NOTE ADULT - SUBJECTIVE AND OBJECTIVE BOX
CHIEF COMPLAINT: no chest pain or sob   no dizziness while laying in the bed  no n/v/d/abd pain or dysuria       PHYSICAL EXAM:    GENERAL: Moderately built, no acute distress   CHEST/LUNG:  No wheezing, no crackles   HEART: Regular rate and rhythm; No murmurs  ABDOMEN: Soft, Nontender, Nondistended; Bowel sounds present  EXTREMITIES:  No clubbing, cyanosis, or edema  NERVOUS SYSTEM:  Grossly non focal.  Psychiatry: AAO x 3, mood is appropriate       OBJECTIVE DATA:   Vital Signs Last 24 Hrs  T(C): 36.8 (11 May 2024 07:15), Max: 37.3 (10 May 2024 15:00)  T(F): 98.3 (11 May 2024 07:15), Max: 99.2 (10 May 2024 15:00)  HR: 62 (11 May 2024 08:00) (60 - 94)  BP: 153/88 (11 May 2024 08:00) (102/72 - 153/88)  BP(mean): 108 (11 May 2024 08:00) (73 - 117)  RR: 14 (11 May 2024 08:00) (10 - 31)  SpO2: 97% (11 May 2024 08:00) (90% - 100%)    Daily Weight in k.8 (11 May 2024 07:07)  LABS:                        8.8    7.61  )-----------( 157      ( 11 May 2024 03:20 )             28.3             05-11    140  |  111<H>  |  16  ----------------------------<  86  3.8   |  23  |  0.77    Ca    8.3<L>      11 May 2024 03:20  Phos  2.2     05-11  Mg     2.1     05-11    TPro  6.3  /  Alb  2.7<L>  /  TBili  0.6  /  DBili  x   /  AST  262<H>  /  ALT  413<H>  /  AlkPhos  72  05-11              PT/INR - ( 09 May 2024 16:58 )   PT: 10.5 sec;   INR: 0.87 ratio         PTT - ( 09 May 2024 16:58 )  PTT:30.7 sec  Urinalysis Basic - ( 11 May 2024 03:20 )    Color: x / Appearance: x / SG: x / pH: x  Gluc: 86 mg/dL / Ketone: x  / Bili: x / Urobili: x   Blood: x / Protein: x / Nitrite: x   Leuk Esterase: x / RBC: x / WBC x   Sq Epi: x / Non Sq Epi: x / Bacteria: x       ABG - ( 10 May 2024 13:49 )  pH, Arterial: 7.50  pH, Blood: x     /  pCO2: 34    /  pO2: 68    / HCO3: 26    / Base Excess: 3.3   /  SaO2: 95.8                 CAPILLARY BLOOD GLUCOSE      POCT Blood Glucose.: 101 mg/dL (11 May 2024 07:19)        Interval Radiology studies: reviewed by me  < from: CT Head No Cont (24 @ 17:20) >  CT HEAD: No acute abnormality. There is contrast within the vessels,   query recent contrast administration from another institution. Contrast   limits evaluation of subdural hematoma, however no gross evidence of   hemorrhage is identified.  Large hematoma of the posterior scalp is   present. No fracture.      CT CERVICAL SPINE: No acute abnormality. Chronic changes as above.    < end of copied text >      MEDICATIONS  (STANDING):  aspirin  chewable 81 milliGRAM(s) Oral daily  atorvastatin 20 milliGRAM(s) Oral at bedtime  chlorhexidine 2% Cloths 1 Application(s) Topical daily  colchicine 0.6 milliGRAM(s) Oral daily  enoxaparin Injectable 40 milliGRAM(s) SubCutaneous every 24 hours  insulin glargine Injectable (LANTUS) 5 Unit(s) SubCutaneous every morning  insulin lispro (ADMELOG) corrective regimen sliding scale   SubCutaneous Before meals and at bedtime  pantoprazole    Tablet 40 milliGRAM(s) Oral before breakfast        
Cardiology Progress Note    Interval Events:  No significant events      MEDICATIONS:  aspirin  chewable 81 milliGRAM(s) Oral daily  enoxaparin Injectable 40 milliGRAM(s) SubCutaneous every 24 hours  pantoprazole    Tablet 40 milliGRAM(s) Oral before breakfast  atorvastatin 20 milliGRAM(s) Oral at bedtime  colchicine 0.6 milliGRAM(s) Oral daily  insulin glargine Injectable (LANTUS) 5 Unit(s) SubCutaneous every morning  insulin lispro (ADMELOG) corrective regimen sliding scale   SubCutaneous Before meals and at bedtime  chlorhexidine 2% Cloths 1 Application(s) Topical daily      PHYSICAL EXAM:  T(C): 36.7 (05-11-24 @ 16:41), Max: 37 (05-11-24 @ 11:57)  HR: 68 (05-11-24 @ 16:41) (60 - 90)  BP: 145/76 (05-11-24 @ 16:41) (102/72 - 160/82)  RR: 18 (05-11-24 @ 16:41) (10 - 31)  SpO2: 96% (05-11-24 @ 16:41) (93% - 100%)  Wt(kg): --  I&O's Summary    10 May 2024 07:01  -  11 May 2024 07:00  --------------------------------------------------------  IN: 1008.4 mL / OUT: 1470 mL / NET: -461.6 mL    11 May 2024 07:01  -  11 May 2024 17:18  --------------------------------------------------------  IN: 240 mL / OUT: 950 mL / NET: -710 mL    Appearance: No acute distress  HEENT:   Normal oral mucosa, PERRL  Cardiovascular: Normal S1 S2, no elevated JVP, no murmurs, no edema  Respiratory: Lungs clear to auscultation	, good air movement  Psychiatry: A & O x 3, Mood & affect appropriate  Gastrointestinal:  soft nt  Skin: No rashes, no ecchymoses, no cyanosis	  Neurologic: grossly non-focal  Extremities: Normal range of motion, no clubbing, cyanosis or edema  Vascular: Peripheral pulses palpable bilaterally    LABS:	 	  CBC Full  -  ( 11 May 2024 03:20 )  WBC Count : 7.61 K/uL  Hemoglobin : 8.8 g/dL  Hematocrit : 28.3 %  Platelet Count - Automated : 157 K/uL    05-11  140  |  111<H>  |  16  ----------------------------<  86  3.8   |  23  |  0.77    05-10  141  |  111<H>  |  15  ----------------------------<  62<L>  3.9   |  25  |  0.82    Ca    8.3<L>      11 May 2024 03:20  Ca    8.1<L>      10 May 2024 13:45  Phos  2.2     05-11  Phos  2.5     05-10  Mg     2.1     05-11  Mg     1.8     05-10    TPro  6.3  /  Alb  2.7<L>  /  TBili  0.6  /  DBili  x   /  AST  262<H>  /  ALT  413<H>  /  AlkPhos  72  05-11  TPro  6.3  /  Alb  2.8<L>  /  TBili  0.4  /  DBili  x   /  AST  355<H>  /  ALT  437<H>  /  AlkPhos  70  05-10      proBNP:   Lipid Profile:   HgA1c:   TSH:     CARDIAC MARKERS:  Troponin I, High Sensitivity Result: 20.5 ng/L (05-10-24 @ 03:50)  Troponin I, High Sensitivity Result: 17.7 ng/L (05-09-24 @ 16:58)        TELEMETRY: 	  SR  ECG:  	  RADIOLOGY:  OTHER: 	    PREVIOUS DIAGNOSTIC TESTING:    [x] Echocardiogram: < from: TTE Echo Complete w/o Contrast w/ Doppler (05.11.24 @ 12:28) >  Summary:   1. Left ventricular ejection fraction, by visual estimation, is 60 to   65%.   2. Normal global left ventricular systolic function.   3. Normal right ventricular size and function.   4. The left atrium is normal in size.   5. Sclerotic aortic valve with normal opening.    < end of copied text >    [ ] Catheterization:  [ ] Stress Test:  	
INTERVAL HPI/OVERNIGHT EVENTS:   HPI:  Pt is a 69 year old female with a pmhx of DM, HTN, HLD who presents from CT coronary angiogram for syncope. Per family at bedside pt PCP recommended angio for evaluation of CAD. Upon arrival pt noted to have hit head, no LOC. CTB and neck negative. Further found to have additional syncope episode with associated junctional bradycardia. history obtained and noted that patient received 150mg metoprolol during imaging. Pt given atropine, glucagon and ultimately ended up on epi infusion for hypotension. MICU consulted for above  (09 May 2024 22:49)      CENTRAL LINE: [ ] YES [ ] NO  LOCATION:   DATE INSERTED:  REMOVE: [ ] YES [ ] NO  EXPLAIN:    [place]: [ ] YES [ ] NO    DATE INSERTED:  REMOVE:  [ ] YES [ ] NO  EXPLAIN:    A-LINE:  [ ] YES [ ] NO  LOCATION:   DATE INSERTED:  REMOVE:  [ ] YES [ ] NO  EXPLAIN:    PAST MEDICAL & SURGICAL HISTORY:  Hypertension      Diabetes      Hyperlipemia          REVIEW OF SYSTEMS:    Negative ROS aside from HPI/Interval events above.    ICU Vital Signs Last 24 Hrs  T(C): 37.1 (10 May 2024 08:00), Max: 37.1 (10 May 2024 08:00)  T(F): 98.7 (10 May 2024 08:00), Max: 98.7 (10 May 2024 08:00)  HR: 77 (10 May 2024 10:30) (32 - 97)  BP: 131/65 (10 May 2024 10:30) (85/57 - 152/75)  BP(mean): 84 (10 May 2024 10:30) (59 - 116)  ABP: --  ABP(mean): --  RR: 24 (10 May 2024 10:30) (12 - 35)  SpO2: 93% (10 May 2024 10:30) (91% - 100%)    O2 Parameters below as of 10 May 2024 00:00  Patient On (Oxygen Delivery Method): room air      ABG - ( 10 May 2024 06:29 )  pH, Arterial: 7.26  pH, Blood: x     /  pCO2: 29    /  pO2: 78    / HCO3: 13    / Base Excess: -12.8 /  SaO2: 96.0          I&O's Detail    09 May 2024 07:01  -  10 May 2024 07:00  --------------------------------------------------------  IN:    EPINEPHrine: 546.4 mL    Lactated Ringers: 600 mL    Norepinephrine: 12 mL  Total IN: 1158.4 mL    OUT:  Total OUT: 0 mL    Total NET: 1158.4 mL      10 May 2024 07:01  -  10 May 2024 10:58  --------------------------------------------------------  IN:    EPINEPHrine: 23.2 mL    Lactated Ringers: 300 mL    Norepinephrine: 21.6 mL  Total IN: 344.8 mL    OUT:    Voided (mL): 550 mL  Total OUT: 550 mL    Total NET: -205.2 mL        CAPILLARY BLOOD GLUCOSE      POCT Blood Glucose.: 290 mg/dL (10 May 2024 05:36)  POCT Blood Glucose.: 384 mg/dL (10 May 2024 00:16)  POCT Blood Glucose.: 402 mg/dL (10 May 2024 00:14)  POCT Blood Glucose.: 257 mg/dL (09 May 2024 18:21)  POCT Blood Glucose.: 120 mg/dL (09 May 2024 16:27)      PHYSICAL EXAM:    General: elderly female in bed, NAD. A+Ox4  HEENt: no jvd  Pulm: clear  Cardio: +s1.s2 RRR  abd: soft  Extr: no edema   Neuro: awake, follows commands      LABS:                        10.5   13.45 )-----------( 188      ( 10 May 2024 03:50 )             34.5      05-10    138  |  109<H>  |  19  ----------------------------<  328<H>  4.3   |  12<L>  |  1.34<H>    Ca    8.0<L>      10 May 2024 03:50  Phos  4.9     05-10  Mg     2.0     05-10    TPro  6.9  /  Alb  3.0<L>  /  TBili  0.6  /  DBili  x   /  AST  498<H>  /  ALT  486<H>  /  AlkPhos  81  05-10    PT/INR - ( 09 May 2024 16:58 )   PT: 10.5 sec;   INR: 0.87 ratio         PTT - ( 09 May 2024 16:58 )  PTT:30.7 sec  Urinalysis Basic - ( 10 May 2024 03:50 )    Color: x / Appearance: x / SG: x / pH: x  Gluc: 328 mg/dL / Ketone: x  / Bili: x / Urobili: x   Blood: x / Protein: x / Nitrite: x   Leuk Esterase: x / RBC: x / WBC x   Sq Epi: x / Non Sq Epi: x / Bacteria: x

## 2024-05-11 NOTE — PROGRESS NOTE ADULT - ASSESSMENT
69 year old female with a pmhx of DM, HTN, HLD who presents from CT coronary angiogram for syncope found to have:    1. Shock  2. Bradycardia   3. BB toxicity     Plan  Neuro: CTB and neck negative s/p fall. NAD. no issues.  Cardio: NSR. off epi. Downtitrate levophed. Surmise beta-blockade/ medication related.  Pulm: on room air. CXR clear today  GI: normal diet.  Renal: strict i/o, renally dose meds prn   ID: no issues  Heme: sq lovenox + SCDs on morning   Endo: ISS q6 for now  Dispo: Downgrade once off levophed.
69 F DM HTN HLD present to ER after [facility] recieved NTG 0.8 and Metoprolol 150mg PO around 15:30   thereafter ~17:00 had syncope, recurred in ER with narrow escape junctional miladis 33-46/min but with /52  Syncope recurred  with hypotension despite atropine x 2 and glucagon, started on epi drip and CCU admit    No evidence of recurrence  Probably hypoperfusion leading to transaminase elevation  TTE wnl    likely bblocker overdose  can discharge from cardiac perspective
Pt is a 69 year old female with a pmhx of DM, HTN, HLD who presents from CT coronary angiogram for syncope. Per family at bedside pt PCP recommended angio for evaluation of CAD. Upon arrival pt noted to have hit head, no LOC. CTB and neck negative. Further found to have additional syncope episode with associated junctional bradycardia. history obtained and noted that patient received 150mg metoprolol during imaging. Pt given atropine, glucagon and ultimately ended up on epi infusion for hypotension. MICU consulted and patient was monitored closely in CCU. Cardiologist was consulted and recommended ECHO.     Syncope from metoprolol induced toxicity causing junctional bradycardia and shock. s/p atropine, iv epi. improved. cont tele. Pending ECHO. Discussed with nurse to help with ambulation. Follow cards recs.   Metabolic acidosis from Hypotension induced poor tissue perfusion. improved.   HTN. controlled. cont to hold norvasc. Monitor.   DM type 2. controlled. cont lantus. cont correctional insulin. Follow finger sticks   GERD. cont PPI.   Microcytic anemia. check iron profile and CBC  Hypophosphatemia: Replete and recheck.  Transaminitis. ? unspecified. no abd pain. Trend labs.   Fall precautions     FC  DVT ppx: Lovenox

## 2024-05-12 ENCOUNTER — TRANSCRIPTION ENCOUNTER (OUTPATIENT)
Age: 69
End: 2024-05-12

## 2024-05-12 VITALS
SYSTOLIC BLOOD PRESSURE: 163 MMHG | OXYGEN SATURATION: 99 % | DIASTOLIC BLOOD PRESSURE: 78 MMHG | HEART RATE: 61 BPM | RESPIRATION RATE: 18 BRPM | TEMPERATURE: 98 F

## 2024-05-12 LAB
GLUCOSE BLDC GLUCOMTR-MCNC: 100 MG/DL — HIGH (ref 70–99)
GLUCOSE BLDC GLUCOMTR-MCNC: 127 MG/DL — HIGH (ref 70–99)

## 2024-05-12 RX ORDER — ATORVASTATIN CALCIUM 80 MG/1
1 TABLET, FILM COATED ORAL
Qty: 0 | Refills: 0 | DISCHARGE
Start: 2024-05-12

## 2024-05-12 RX ORDER — COLCHICINE 0.6 MG
1 TABLET ORAL
Qty: 0 | Refills: 0 | DISCHARGE
Start: 2024-05-12

## 2024-05-12 RX ORDER — ASPIRIN/CALCIUM CARB/MAGNESIUM 324 MG
1 TABLET ORAL
Qty: 0 | Refills: 0 | DISCHARGE
Start: 2024-05-12

## 2024-05-12 RX ADMIN — Medication 0.6 MILLIGRAM(S): at 11:42

## 2024-05-12 RX ADMIN — PANTOPRAZOLE SODIUM 40 MILLIGRAM(S): 20 TABLET, DELAYED RELEASE ORAL at 05:35

## 2024-05-12 RX ADMIN — INSULIN GLARGINE 5 UNIT(S): 100 INJECTION, SOLUTION SUBCUTANEOUS at 08:31

## 2024-05-12 RX ADMIN — Medication 81 MILLIGRAM(S): at 11:42

## 2024-05-12 RX ADMIN — ENOXAPARIN SODIUM 40 MILLIGRAM(S): 100 INJECTION SUBCUTANEOUS at 05:35

## 2024-05-12 NOTE — DISCHARGE NOTE PROVIDER - HOSPITAL COURSE
Pt is a 69 year old female with a pmhx of DM, HTN, HLD who presents from CT coronary angiogram for syncope. Per family at bedside pt PCP recommended angio for evaluation of CAD. Upon arrival pt noted to have hit head, no LOC. CTB and neck negative. Further found to have additional syncope episode with associated junctional bradycardia. history obtained and noted that patient received 150mg metoprolol during imaging. Pt given atropine, glucagon and ultimately ended up on epi infusion for hypotension. MICU consulted and patient was monitored closely in CCU. Cardiologist was consulted and recommended ECHO.     Syncope from metoprolol induced toxicity causing junctional bradycardia and shock. s/p atropine, iv epi. improved. ECHO with normal EF. cards consulted and recommended no further work up . ok to dc home.   Metabolic acidosis from Hypotension induced poor tissue perfusion. improved.   HTN. controlled. cont to hold norvasc. Monitor.   DM type 2. controlled. cont lantus. cont correctional insulin. Follow finger sticks   GERD. cont PPI.   Microcytic anemia. outpatient PCP follow up is recommended.   Hypophosphatemia: Repleted  Transaminitis. from Hypotension induced poor tissue perfusion. improving.        Seen and examined by me today. Vitals stable.   I have discussed all the inpatient radiographic findings with the patient and stressed that patient follows with the PCP for further outpatient care. I have educated patient to use [employer] patient portal (as instructed on the discharge paperwork) to access medical records outside the hospital.   All questions welcomed and answered appropriately. Patient verbalized understanding of post discharge physician's follows up and discharge instructions.   DC time spent by me face to face excluding billable procedures 38 mins Pt is a 69 year old female with a pmhx of DM, HTN, HLD who presents from CT coronary angiogram for syncope. Per family at bedside pt PCP recommended angio for evaluation of CAD. Upon arrival pt noted to have hit head, no LOC. CTB and neck negative. Further found to have additional syncope episode with associated junctional bradycardia. history obtained and noted that patient received 150mg metoprolol during imaging. Pt given atropine, glucagon and ultimately ended up on epi infusion for hypotension. MICU consulted and patient was monitored closely in CCU. Cardiologist was consulted and recommended ECHO.     Syncope from metoprolol induced toxicity ( adverse rx from appropriate dose of metoprolol) causing junctional bradycardia and shock. s/p atropine, iv epi. improved. ECHO with normal EF. cards consulted and recommended no further work up . ok to dc home.   Metabolic acidosis from Hypotension induced poor tissue perfusion. improved.   HTN. controlled. cont to hold norvasc. Monitor.   DM type 2. controlled. cont lantus. cont correctional insulin. Follow finger sticks   GERD. cont PPI.   Microcytic anemia. outpatient PCP follow up is recommended.   Hypophosphatemia: Repleted  Transaminitis. from Hypotension induced poor tissue perfusion. improving.        Seen and examined by me today. Vitals stable.   I have discussed all the inpatient radiographic findings with the patient and stressed that patient follows with the PCP for further outpatient care. I have educated patient to use [employer] patient portal (as instructed on the discharge paperwork) to access medical records outside the hospital.   All questions welcomed and answered appropriately. Patient verbalized understanding of post discharge physician's follows up and discharge instructions.   DC time spent by me face to face excluding billable procedures 38 mins

## 2024-05-12 NOTE — DISCHARGE NOTE PROVIDER - YES NO FOR MLM POSITIVE OR NEGATIVE COVID RESULT
---- Message from Emi Deleon sent at 5/2/2024  3:01 PM CDT -----  Type:  Test Results    Who Called: Mary Ellen Nurse Coordinator   Name of Test (Lab/Mammo/Etc): EKG   Date of Test: 04/25  Ordering Provider: Yousef   Where the test was performed: [facility]   Would the patient rather a call back or a response via [facility]? Call back   Best Call Back Number: [phone]   Additional Information:  Please be advised, caller stated that pt said she hasn't received results yet from EKG  
,

## 2024-05-12 NOTE — DISCHARGE NOTE NURSING/CASE MANAGEMENT/SOCIAL WORK - PATIENT PORTAL LINK FT
You can access the FollowMyHealth Patient Portal offered by [facility] by registering at the following website: [email or web address]. By joining [employer]’s FollowMyHealth portal, you will also be able to view your health information using other applications (apps) compatible with our system.

## 2024-05-12 NOTE — DISCHARGE NOTE PROVIDER - NSDCMRMEDTOKEN_GEN_ALL_CORE_FT
aspirin 81 mg oral tablet, chewable: 1 tab(s) orally once a day  atorvastatin 20 mg oral tablet: 1 tab(s) orally once a day (at bedtime)  colchicine 0.6 mg oral tablet: 1 tab(s) orally once a day

## 2024-05-12 NOTE — DISCHARGE NOTE PROVIDER - NSDCFUADDINST_GEN_ALL_CORE_FT
1) It is important to see your primary physician as well as other necessary consultants within next 7-10 days to perform a comprehensive medical review.  Call their offices for an appointment as soon as you leave the hospital.  If you do not have a primary physician or unable to reach your PCP, contact the [facility] Physician Referral Service at [phone].  Your medical issues appear to be stable at this time, but if your symptoms recur or worsen, contact your physicians and/or return to the hospital if necessary.  If you encounter any issues or questions with your medication, call your physicians before stopping the medication.    2) Please access [facility] Patient portal (as instructed on the discharge paperwork) to access your medical records at any time after discharge.     3) please continue with your pre-admissions home medications without any change.
guil

## 2024-05-12 NOTE — DISCHARGE NOTE PROVIDER - NSDCCPCAREPLAN_GEN_ALL_CORE_FT
PRINCIPAL DISCHARGE DIAGNOSIS  Diagnosis: Junctional bradycardia  Assessment and Plan of Treatment:       SECONDARY DISCHARGE DIAGNOSES  Diagnosis: Adverse reaction to metoprolol  Assessment and Plan of Treatment:

## 2025-07-14 NOTE — CONSULT NOTE ADULT - SUBJECTIVE AND OBJECTIVE BOX
--DO NOT REPLY - Sent from PACT - If sent to wrong pool, reroute to P [facility] Reroute pool --    Message Type:  Refill Medication   Is the medication pended:Yes  Medication name: lisinopril  Message: patient out today  Preferred pharmacy verified, and selected.  [id number] - [place], [place] - [address] AT [facility] [address]  Call Back #: [phone]  Can a detailed message be left?  Yes - Voicemail   Is the patient OUT of Medication?  Yes: Working Hours: route as HIGH priority according to KB. Patient has been advised the message will be reviewed within 1 business dayCopied from CRM #[id number]. Topic: MW Medication/Rx - MW Rx Refill  >> Jul 14, 2025 11:08 AM Paola SHARMA wrote:  Aye Lagunas called to request a medication refill that is Out of medication or is critically low in meds during    Working Hours [facility] to process refill.   >ADDED NOTE / CREATED CUSTOM CLINICAL CALL  >Reason for call 'Refill Request'  >Sent HIGH priority to P [facility] CLINICAL MSG POOL [[phone]].   History of Present Illness:  History of Present Illness:   Pt is a 69 year old female with a pmhx of DM, HTN, HLD who presents from CT coronary angiogram for syncope. Per family at bedside pt PCP recommended angio for evaluation of CAD. Upon arrival pt noted to have hit head, no LOC. CTB and neck negative. Further found to have additional syncope episode with associated junctional bradycardia. history obtained and noted that patient received 150mg metoprolol during imaging. Pt given atropine, glucagon and ultimately ended up on epi infusion for hypotension. MICU consulted for above Physical Exam:   Physical Exam: General: elderly female in bed, NAD somewhat confused  HEENt: no jvd  Pulm: clear  Cardio: +s1.s2  abd: soft  Extr: no edema   Neuro: awake, follows commands  Labs and Results:  Labs, Radiology, Cardiology, and Other Results: 05-09    137  |  104  |  13  ----------------------------<  148<H>  4.7   |  26  |  0.77    Ca    9.3      09 May 2024 16:58    TPro  7.9  /  Alb  3.5  /  TBili  0.4  /  DBili  x   /  AST  73<H>  /  ALT  105<H>  /  AlkPhos  86  05-09

## 2025-09-11 ENCOUNTER — NON-APPOINTMENT (OUTPATIENT)
Age: 70
End: 2025-09-11

## 2025-09-11 ENCOUNTER — APPOINTMENT (OUTPATIENT)
Dept: ELECTROPHYSIOLOGY | Facility: CLINIC | Age: 70
End: 2025-09-11
Payer: COMMERCIAL

## 2025-09-11 VITALS
HEIGHT: 66 IN | HEART RATE: 74 BPM | WEIGHT: 142 LBS | TEMPERATURE: 97.3 F | DIASTOLIC BLOOD PRESSURE: 47 MMHG | BODY MASS INDEX: 22.82 KG/M2 | SYSTOLIC BLOOD PRESSURE: 183 MMHG | OXYGEN SATURATION: 96 %

## 2025-09-11 VITALS — DIASTOLIC BLOOD PRESSURE: 74 MMHG | SYSTOLIC BLOOD PRESSURE: 161 MMHG

## 2025-09-11 DIAGNOSIS — I31.9 DISEASE OF PERICARDIUM, UNSPECIFIED: ICD-10-CM

## 2025-09-11 DIAGNOSIS — I10 ESSENTIAL (PRIMARY) HYPERTENSION: ICD-10-CM

## 2025-09-11 DIAGNOSIS — I45.5 OTHER SPECIFIED HEART BLOCK: ICD-10-CM

## 2025-09-11 DIAGNOSIS — I49.5 SICK SINUS SYNDROME: ICD-10-CM

## 2025-09-11 DIAGNOSIS — M19.90 UNSPECIFIED OSTEOARTHRITIS, UNSPECIFIED SITE: ICD-10-CM

## 2025-09-11 DIAGNOSIS — R55 SYNCOPE AND COLLAPSE: ICD-10-CM

## 2025-09-11 DIAGNOSIS — Z86.39 PERSONAL HISTORY OF OTHER ENDOCRINE, NUTRITIONAL AND METABOLIC DISEASE: ICD-10-CM

## 2025-09-11 DIAGNOSIS — Z78.9 OTHER SPECIFIED HEALTH STATUS: ICD-10-CM

## 2025-09-11 PROCEDURE — G2211 COMPLEX E/M VISIT ADD ON: CPT

## 2025-09-11 PROCEDURE — 93000 ELECTROCARDIOGRAM COMPLETE: CPT

## 2025-09-11 PROCEDURE — 99205 OFFICE O/P NEW HI 60 MIN: CPT

## 2025-09-11 RX ORDER — AMLODIPINE BESYLATE 2.5 MG/1
2.5 TABLET ORAL DAILY
Qty: 30 | Refills: 3 | Status: ACTIVE | COMMUNITY
Start: 2025-09-11

## 2025-09-11 RX ORDER — GLIPIZIDE 5 MG/1
5 TABLET ORAL
Qty: 30 | Refills: 0 | Status: ACTIVE | COMMUNITY
Start: 2025-09-11

## 2025-09-11 RX ORDER — ATORVASTATIN CALCIUM 20 MG/1
20 TABLET, FILM COATED ORAL
Qty: 1 | Refills: 2 | Status: ACTIVE | COMMUNITY
Start: 2025-09-11

## 2025-09-11 RX ORDER — METFORMIN HYDROCHLORIDE 500 MG/1
500 TABLET, COATED ORAL
Qty: 60 | Refills: 3 | Status: ACTIVE | COMMUNITY
Start: 2025-09-11